# Patient Record
Sex: FEMALE | Race: WHITE | NOT HISPANIC OR LATINO | Employment: FULL TIME | URBAN - METROPOLITAN AREA
[De-identification: names, ages, dates, MRNs, and addresses within clinical notes are randomized per-mention and may not be internally consistent; named-entity substitution may affect disease eponyms.]

---

## 2017-07-28 ENCOUNTER — HOSPITAL ENCOUNTER (EMERGENCY)
Facility: HOSPITAL | Age: 22
Discharge: HOME | End: 2017-07-28
Payer: MEDICAID

## 2017-07-28 VITALS
RESPIRATION RATE: 20 BRPM | HEIGHT: 66 IN | HEART RATE: 78 BPM | SYSTOLIC BLOOD PRESSURE: 119 MMHG | WEIGHT: 181 LBS | BODY MASS INDEX: 29.09 KG/M2 | OXYGEN SATURATION: 98 % | TEMPERATURE: 98 F | DIASTOLIC BLOOD PRESSURE: 71 MMHG

## 2017-07-28 DIAGNOSIS — R10.2 PELVIC PAIN IN FEMALE: ICD-10-CM

## 2017-07-28 DIAGNOSIS — N39.0 URINARY TRACT INFECTION WITHOUT HEMATURIA, SITE UNSPECIFIED: Primary | ICD-10-CM

## 2017-07-28 LAB
B-HCG UR QL: NEGATIVE
BACTERIA #/AREA URNS HPF: ABNORMAL /HPF
BILIRUB UR QL STRIP: NEGATIVE
CLARITY UR: CLEAR
COLOR UR: YELLOW
GLUCOSE UR QL STRIP: NEGATIVE
HGB UR QL STRIP: NEGATIVE
KETONES UR QL STRIP: NEGATIVE
LEUKOCYTE ESTERASE UR QL STRIP: ABNORMAL
MICROSCOPIC COMMENT: ABNORMAL
NITRITE UR QL STRIP: POSITIVE
PH UR STRIP: 6 [PH] (ref 5–8)
PROT UR QL STRIP: NEGATIVE
SP GR UR STRIP: 1.02 (ref 1–1.03)
SQUAMOUS #/AREA URNS HPF: 5 /HPF
URN SPEC COLLECT METH UR: ABNORMAL
UROBILINOGEN UR STRIP-ACNC: NEGATIVE EU/DL
WBC #/AREA URNS HPF: 30 /HPF (ref 0–5)
WBC CLUMPS URNS QL MICRO: ABNORMAL

## 2017-07-28 PROCEDURE — 81000 URINALYSIS NONAUTO W/SCOPE: CPT

## 2017-07-28 PROCEDURE — 99283 EMERGENCY DEPT VISIT LOW MDM: CPT

## 2017-07-28 PROCEDURE — 81025 URINE PREGNANCY TEST: CPT

## 2017-07-28 RX ORDER — NITROFURANTOIN (MACROCRYSTALS) 100 MG/1
100 CAPSULE ORAL 2 TIMES DAILY
Qty: 10 CAPSULE | Refills: 0 | Status: SHIPPED | OUTPATIENT
Start: 2017-07-28 | End: 2017-08-02

## 2017-07-29 NOTE — ED PROVIDER NOTES
"SCRIBE #1 NOTE: I, Kristen Issac, am scribing for, and in the presence of, KARINA Gloria. I have scribed the entire note.      History      Chief Complaint   Patient presents with    Pelvic Pain     pt c/o lower abd pain; states she is currently on her cycle       Review of patient's allergies indicates:  No Known Allergies     HPI   HPI    7/28/2017, 8:58 PM   History obtained from the patient      History of Present Illness: Melany Pfeiffer is a 22 y.o. female patient who presents to the Emergency Department for pelvic pain with lower abd pain which onset gradually over the last few days. Symptoms are constant and moderate in severity. Pt reports that she is on her menstrual cycle currently and "feels like everything is being pulled in her pelvic area when she stands." No mitigating or exacerbating factors reported. No associated sxs reported. Patient denies any fever, chills, constipation, hematochezia, dysuria, hematuria, urinary frequency, difficulty urinating, urine decreased, N/V/D and all other sxs at this time. No further complaints or concerns at this time.         Arrival mode: Personal vehicle     PCP: Masood Marcano MD       Past Medical History:  History reviewed. No pertinent past medical history.    Past Surgical History:  Past Surgical History:   Procedure Laterality Date    TUBAL LIGATION           Family History:  History reviewed. No pertinent family history.    Social History:  Social History     Social History Main Topics    Smoking status: Never Smoker    Smokeless tobacco: Not given    Alcohol use No    Drug use: No    Sexual activity: Not given       ROS   Review of Systems   Constitutional: Negative for chills and fever.   HENT: Negative for sore throat.    Respiratory: Negative for shortness of breath.    Cardiovascular: Negative for chest pain.   Gastrointestinal: Positive for abdominal pain (lower). Negative for blood in stool, constipation, diarrhea, nausea and " "vomiting.   Genitourinary: Positive for pelvic pain. Negative for dysuria, frequency and hematuria.   Musculoskeletal: Negative for back pain.   Skin: Negative for rash.   Neurological: Negative for weakness.   Hematological: Does not bruise/bleed easily.   All other systems reviewed and are negative.    Physical Exam      Initial Vitals [07/28/17 2023]   BP Pulse Resp Temp SpO2   119/71 78 20 98.1 °F (36.7 °C) 98 %      MAP       87          Physical Exam  Nursing Notes and Vital Signs Reviewed.  Constitutional: Patient is in no apparent distress. Well-developed and well-nourished.  Head: Atraumatic. Normocephalic.  Eyes: PERRL. EOM intact. Conjunctivae are not pale. No scleral icterus.  ENT: Mucous membranes are moist. Oropharynx is clear and symmetric.    Neck: Supple. Full ROM. No lymphadenopathy.  Cardiovascular: Regular rate. Regular rhythm. No murmurs, rubs, or gallops. Distal pulses are 2+ and symmetric.  Pulmonary/Chest: No respiratory distress. Clear to auscultation bilaterally. No wheezing, rales, or rhonchi.  Abdominal: Soft and non-distended.  There is no tenderness.  No rebound, guarding, or rigidity. Good bowel sounds.  Genitourinary: No CVA tenderness  Musculoskeletal: Moves all extremities. No obvious deformities. No edema. No calf tenderness.  Skin: Warm and dry.  Neurological:  Alert, awake, and appropriate.  Normal speech.  No acute focal neurological deficits are appreciated.  Psychiatric: Normal affect. Good eye contact. Appropriate in content.    ED Course    Procedures  ED Vital Signs:  Vitals:    07/28/17 2023   BP: 119/71   Pulse: 78   Resp: 20   Temp: 98.1 °F (36.7 °C)   TempSrc: Oral   SpO2: 98%   Weight: 82.1 kg (181 lb)   Height: 5' 5.5" (1.664 m)       Abnormal Lab Results:  Labs Reviewed   URINALYSIS - Abnormal; Notable for the following:        Result Value    Nitrite, UA Positive (*)     Leukocytes, UA Trace (*)     All other components within normal limits   URINALYSIS MICROSCOPIC " - Abnormal; Notable for the following:     WBC, UA 30 (*)     Bacteria, UA Moderate (*)     All other components within normal limits   PREGNANCY TEST, URINE RAPID        All Lab Results:  Results for orders placed or performed during the hospital encounter of 07/28/17   Urinalysis   Result Value Ref Range    Specimen UA Urine, Clean Catch     Color, UA Yellow Yellow, Straw, Hiral    Appearance, UA Clear Clear    pH, UA 6.0 5.0 - 8.0    Specific Gravity, UA 1.025 1.005 - 1.030    Protein, UA Negative Negative    Glucose, UA Negative Negative    Ketones, UA Negative Negative    Bilirubin (UA) Negative Negative    Occult Blood UA Negative Negative    Nitrite, UA Positive (A) Negative    Urobilinogen, UA Negative <2.0 EU/dL    Leukocytes, UA Trace (A) Negative   Pregnancy, urine rapid (UPT)   Result Value Ref Range    Preg Test, Ur Negative    Urinalysis Microscopic   Result Value Ref Range    WBC, UA 30 (H) 0 - 5 /hpf    WBC Clumps, UA Rare None-Rare    Bacteria, UA Moderate (A) None-Occ /hpf    Squam Epithel, UA 5 /hpf    Microscopic Comment SEE COMMENT             The Emergency Provider reviewed the vital signs and test results, which are outlined above.    ED Discussion   9:05 PM: Pt differed pelvic exam.  States she has an appointment with OB/GYN in 3 days.     9:24 PM: Discussed pt dx and plan of tx. Gave pt all f/u and return to the ED instructions. All questions and concerns were addressed at this time. Pt expresses understanding of information and instructions, and is comfortable with plan to discharge. Pt is stable for discharge.    I discussed with patient and/or family/caretaker that evaluation in the ED does not suggest any emergent or life threatening medical conditions requiring immediate intervention beyond what was provided in the ED, and I believe patient is safe for discharge.  Regardless, an unremarkable evaluation in the ED does not preclude the development or presence of a serious of life  threatening condition. As such, patient was instructed to return immediately for any worsening or change in current symptoms.        ED Medication(s):  Medications - No data to display    Discharge Medication List as of 7/28/2017  9:22 PM      START taking these medications    Details   nitrofurantoin (MACRODANTIN) 100 MG capsule Take 1 capsule (100 mg total) by mouth 2 (two) times daily., Starting Fri 7/28/2017, Until Wed 8/2/2017, Print             Follow-up Information     Masood Marcano MD In 3 days.    Specialty:  Internal Medicine  Contact information:  [address] [place] [id number] [phone]             OB/GYN.    Contact information:  Keep follow up appointment on 7/31/2017                   Medical Decision Making    Medical Decision Making:   Clinical Tests:   Lab Tests: Ordered and Reviewed           Scribe Attestation:   Scribe #1: I performed the above scribed service and the documentation accurately describes the services I performed. I attest to the accuracy of the note.    Attending:   Physician Attestation Statement for Scribe #1: I, Genoveva COLLINS, personally performed the services described in this documentation, as scribed by Kristen Davenport, in my presence, and it is both accurate and complete.          Clinical Impression       ICD-10-CM ICD-9-CM   1. Urinary tract infection without hematuria, site unspecified N39.0 599.0   2. Pelvic pain in female R10.2 625.9       Disposition:   Disposition: Discharged  Condition: Stable         Genoveva Trejo PA-C  07/29/17 1228

## 2018-08-15 ENCOUNTER — HOSPITAL ENCOUNTER (EMERGENCY)
Facility: HOSPITAL | Age: 23
Discharge: HOME | End: 2018-08-15
Attending: EMERGENCY MEDICINE
Payer: MEDICAID

## 2018-08-15 VITALS
RESPIRATION RATE: 17 BRPM | HEART RATE: 86 BPM | WEIGHT: 155 LBS | TEMPERATURE: 99 F | OXYGEN SATURATION: 100 % | HEIGHT: 66 IN | BODY MASS INDEX: 24.91 KG/M2 | DIASTOLIC BLOOD PRESSURE: 65 MMHG | SYSTOLIC BLOOD PRESSURE: 117 MMHG

## 2018-08-15 DIAGNOSIS — N39.0 URINARY TRACT INFECTION WITHOUT HEMATURIA, SITE UNSPECIFIED: Primary | ICD-10-CM

## 2018-08-15 DIAGNOSIS — A59.9 TRICHOMONIASIS: ICD-10-CM

## 2018-08-15 LAB
ALBUMIN SERPL BCP-MCNC: 3.7 G/DL
ALP SERPL-CCNC: 73 U/L
ALT SERPL W/O P-5'-P-CCNC: 13 U/L
AMPHET+METHAMPHET UR QL: NEGATIVE
ANION GAP SERPL CALC-SCNC: 9 MMOL/L
AST SERPL-CCNC: 19 U/L
B-HCG UR QL: NEGATIVE
BACTERIA #/AREA URNS HPF: ABNORMAL /HPF
BARBITURATES UR QL SCN>200 NG/ML: NEGATIVE
BASOPHILS # BLD AUTO: 0.02 K/UL
BASOPHILS NFR BLD: 0.3 %
BENZODIAZ UR QL SCN>200 NG/ML: NEGATIVE
BILIRUB SERPL-MCNC: 0.4 MG/DL
BILIRUB UR QL STRIP: NEGATIVE
BUN SERPL-MCNC: 7 MG/DL
BZE UR QL SCN: NEGATIVE
CALCIUM SERPL-MCNC: 8.8 MG/DL
CANNABINOIDS UR QL SCN: NEGATIVE
CHLORIDE SERPL-SCNC: 108 MMOL/L
CLARITY UR: CLEAR
CO2 SERPL-SCNC: 21 MMOL/L
COLOR UR: YELLOW
CREAT SERPL-MCNC: 0.6 MG/DL
CREAT UR-MCNC: 126.7 MG/DL
DIFFERENTIAL METHOD: ABNORMAL
EOSINOPHIL # BLD AUTO: 0.1 K/UL
EOSINOPHIL NFR BLD: 0.9 %
ERYTHROCYTE [DISTWIDTH] IN BLOOD BY AUTOMATED COUNT: 13.8 %
EST. GFR  (AFRICAN AMERICAN): >60 ML/MIN/1.73 M^2
EST. GFR  (NON AFRICAN AMERICAN): >60 ML/MIN/1.73 M^2
GLUCOSE SERPL-MCNC: 89 MG/DL
GLUCOSE UR QL STRIP: NEGATIVE
HCT VFR BLD AUTO: 34.3 %
HGB BLD-MCNC: 11.8 G/DL
HGB UR QL STRIP: NEGATIVE
KETONES UR QL STRIP: NEGATIVE
LEUKOCYTE ESTERASE UR QL STRIP: ABNORMAL
LIPASE SERPL-CCNC: 7 U/L
LYMPHOCYTES # BLD AUTO: 1.5 K/UL
LYMPHOCYTES NFR BLD: 18.3 %
MCH RBC QN AUTO: 30.5 PG
MCHC RBC AUTO-ENTMCNC: 34.4 G/DL
MCV RBC AUTO: 89 FL
METHADONE UR QL SCN>300 NG/ML: NEGATIVE
MICROSCOPIC COMMENT: ABNORMAL
MONOCYTES # BLD AUTO: 0.5 K/UL
MONOCYTES NFR BLD: 6.1 %
NEUTROPHILS # BLD AUTO: 6 K/UL
NEUTROPHILS NFR BLD: 74.4 %
NITRITE UR QL STRIP: NEGATIVE
OPIATES UR QL SCN: NEGATIVE
PCP UR QL SCN>25 NG/ML: NEGATIVE
PH UR STRIP: 7 [PH] (ref 5–8)
PLATELET # BLD AUTO: 223 K/UL
PMV BLD AUTO: 9.1 FL
POTASSIUM SERPL-SCNC: 4 MMOL/L
PROT SERPL-MCNC: 6.4 G/DL
PROT UR QL STRIP: NEGATIVE
RBC # BLD AUTO: 3.87 M/UL
SODIUM SERPL-SCNC: 138 MMOL/L
SP GR UR STRIP: 1.02 (ref 1–1.03)
TOXICOLOGY INFORMATION: NORMAL
TRICHOMONAS UR QL MICRO: ABNORMAL
URN SPEC COLLECT METH UR: ABNORMAL
UROBILINOGEN UR STRIP-ACNC: NEGATIVE EU/DL
WBC # BLD AUTO: 8 K/UL
WBC #/AREA URNS HPF: >100 /HPF (ref 0–5)

## 2018-08-15 PROCEDURE — 83690 ASSAY OF LIPASE: CPT

## 2018-08-15 PROCEDURE — 25500020 PHARM REV CODE 255: Performed by: EMERGENCY MEDICINE

## 2018-08-15 PROCEDURE — 87086 URINE CULTURE/COLONY COUNT: CPT

## 2018-08-15 PROCEDURE — 80053 COMPREHEN METABOLIC PANEL: CPT

## 2018-08-15 PROCEDURE — 85025 COMPLETE CBC W/AUTO DIFF WBC: CPT

## 2018-08-15 PROCEDURE — 80307 DRUG TEST PRSMV CHEM ANLYZR: CPT

## 2018-08-15 PROCEDURE — 96361 HYDRATE IV INFUSION ADD-ON: CPT

## 2018-08-15 PROCEDURE — 81025 URINE PREGNANCY TEST: CPT

## 2018-08-15 PROCEDURE — 96365 THER/PROPH/DIAG IV INF INIT: CPT

## 2018-08-15 PROCEDURE — 87491 CHLMYD TRACH DNA AMP PROBE: CPT

## 2018-08-15 PROCEDURE — 25000003 PHARM REV CODE 250: Performed by: EMERGENCY MEDICINE

## 2018-08-15 PROCEDURE — 63600175 PHARM REV CODE 636 W HCPCS: Performed by: EMERGENCY MEDICINE

## 2018-08-15 PROCEDURE — 81000 URINALYSIS NONAUTO W/SCOPE: CPT | Mod: 59

## 2018-08-15 PROCEDURE — 99285 EMERGENCY DEPT VISIT HI MDM: CPT | Mod: 25

## 2018-08-15 RX ORDER — CIPROFLOXACIN 500 MG/1
500 TABLET ORAL 2 TIMES DAILY
Qty: 20 TABLET | Refills: 0 | Status: SHIPPED | OUTPATIENT
Start: 2018-08-15 | End: 2018-08-25

## 2018-08-15 RX ORDER — METRONIDAZOLE 500 MG/1
2000 TABLET ORAL
Status: COMPLETED | OUTPATIENT
Start: 2018-08-15 | End: 2018-08-15

## 2018-08-15 RX ADMIN — METRONIDAZOLE 2000 MG: 500 TABLET ORAL at 10:08

## 2018-08-15 RX ADMIN — CEFTRIAXONE 1 G: 1 INJECTION, SOLUTION INTRAVENOUS at 10:08

## 2018-08-15 RX ADMIN — SODIUM CHLORIDE 1000 ML: 0.9 INJECTION, SOLUTION INTRAVENOUS at 09:08

## 2018-08-15 RX ADMIN — IOHEXOL 75 ML: 350 INJECTION, SOLUTION INTRAVENOUS at 10:08

## 2018-08-16 NOTE — ED PROVIDER NOTES
SCRIBE #1 NOTE: I, Pili Lakisha, am scribing for, and in the presence of, Afshin Evans MD. I have scribed the entire note.      History      Chief Complaint   Patient presents with    Abdominal Pain     RLQ x 3 days,        Review of patient's allergies indicates:  No Known Allergies     HPI   HPI    8/15/2018, 8:42 PM   History obtained from the patient      History of Present Illness: Melnay Pfeiffer is a 23 y.o. female patient who presents to the Emergency Department for RLQ abd pain which onset gradually 4 days ago and progressively worsened today. Symptoms are constant and moderate in severity. Sxs exacerbated with palpation. No other mitigating or exacerbating factors reported. No associated sxs reported. Patient denies any fever, chills, constipation, hematochezia, dysuria, hematuria, urinary frequency, n/v/d, and all other sxs at this time. No prior tx reported. No further complaints or concerns at this time.         Arrival mode: [facility]    PCP: Masood Marcano MD       Past Medical History:  No past medical history on file.    Past Surgical History:  Past Surgical History:   Procedure Laterality Date    TUBAL LIGATION           Family History:  Reviewed, not pertinent.    Social History:  Social History     Tobacco Use    Smoking status: Never Smoker    Smokeless tobacco: Never Used   Substance and Sexual Activity    Alcohol use: No    Drug use: No    Sexual activity: Yes     Partners: Male     Birth control/protection: None       ROS   Review of Systems   Constitutional: Negative for chills and fever.   HENT: Negative for sore throat.    Respiratory: Negative for shortness of breath.    Cardiovascular: Negative for chest pain.   Gastrointestinal: Positive for abdominal pain (RLQ). Negative for blood in stool, constipation, diarrhea, nausea and vomiting.   Genitourinary: Negative for dysuria, frequency and hematuria.   Musculoskeletal: Negative for back pain.   Skin: Negative for  "rash.   Neurological: Negative for weakness.   Hematological: Does not bruise/bleed easily.   All other systems reviewed and are negative.    Physical Exam      Initial Vitals [08/15/18 2035]   BP Pulse Resp Temp SpO2   (!) 118/55 110 18 98.6 °F (37 °C) 99 %      MAP       --          Physical Exam  Nursing Notes and Vital Signs Reviewed.  Constitutional: Patient is in no acute distress. Well-developed and well-nourished.  Head: Atraumatic. Normocephalic.  Eyes: PERRL. EOM intact. Conjunctivae are not pale. No scleral icterus.  ENT: Mucous membranes are moist. Oropharynx is clear and symmetric.    Neck: Supple. Full ROM. No lymphadenopathy.  Cardiovascular: Regular rate. Regular rhythm. No murmurs, rubs, or gallops. Distal pulses are 2+ and symmetric.  Pulmonary/Chest: No respiratory distress. Clear to auscultation bilaterally. No wheezing or rales.  Abdominal: Soft and non-distended.  There is RLQ tenderness.  No rebound, guarding, or rigidity.   Musculoskeletal: Moves all extremities. No obvious deformities. No edema.   Skin: Warm and dry.  Neurological:  Alert, awake, and appropriate.  Normal speech.  No acute focal neurological deficits are appreciated.  Psychiatric: Normal affect. Good eye contact. Appropriate in content.    ED Course    Procedures  ED Vital Signs:  Vitals:    08/15/18 2035 08/15/18 2100 08/15/18 2215   BP: (!) 118/55 (!) 110/57 (!) 102/59   Pulse: 110 85 81   Resp: 18  17   Temp: 98.6 °F (37 °C) 98.6 °F (37 °C)    TempSrc: Oral     SpO2: 99% 96% 100%   Weight: 70.3 kg (155 lb)     Height: 5' 6" (1.676 m)         Abnormal Lab Results:  Labs Reviewed   CBC W/ AUTO DIFFERENTIAL - Abnormal; Notable for the following components:       Result Value    RBC 3.87 (*)     Hemoglobin 11.8 (*)     Hematocrit 34.3 (*)     MPV 9.1 (*)     Gran% 74.4 (*)     All other components within normal limits   COMPREHENSIVE METABOLIC PANEL - Abnormal; Notable for the following components:    CO2 21 (*)     All other " components within normal limits   URINALYSIS, REFLEX TO URINE CULTURE - Abnormal; Notable for the following components:    Leukocytes, UA 3+ (*)     All other components within normal limits    Narrative:     Preferred Collection Type->Urine, Clean Catch   URINALYSIS MICROSCOPIC - Abnormal; Notable for the following components:    WBC, UA >100 (*)     Bacteria, UA Few (*)     Trichomonas, UA Few (*)     All other components within normal limits    Narrative:     Preferred Collection Type->Urine, Clean Catch   C. TRACHOMATIS/N. GONORRHOEAE BY AMP DNA   CULTURE, URINE   C. TRACHOMATIS/N. GONORRHOEAE BY AMP DNA   LIPASE   PREGNANCY TEST, URINE RAPID   DRUG SCREEN PANEL, URINE EMERGENCY        All Lab Results:  Results for orders placed or performed during the hospital encounter of 08/15/18   CBC W/ AUTO DIFFERENTIAL   Result Value Ref Range    WBC 8.00 3.90 - 12.70 K/uL    RBC 3.87 (L) 4.00 - 5.40 M/uL    Hemoglobin 11.8 (L) 12.0 - 16.0 g/dL    Hematocrit 34.3 (L) 37.0 - 48.5 %    MCV 89 82 - 98 fL    MCH 30.5 27.0 - 31.0 pg    MCHC 34.4 32.0 - 36.0 g/dL    RDW 13.8 11.5 - 14.5 %    Platelets 223 150 - 350 K/uL    MPV 9.1 (L) 9.2 - 12.9 fL    Gran # (ANC) 6.0 1.8 - 7.7 K/uL    Lymph # 1.5 1.0 - 4.8 K/uL    Mono # 0.5 0.3 - 1.0 K/uL    Eos # 0.1 0.0 - 0.5 K/uL    Baso # 0.02 0.00 - 0.20 K/uL    Gran% 74.4 (H) 38.0 - 73.0 %    Lymph% 18.3 18.0 - 48.0 %    Mono% 6.1 4.0 - 15.0 %    Eosinophil% 0.9 0.0 - 8.0 %    Basophil% 0.3 0.0 - 1.9 %    Differential Method Automated    Comp. Metabolic Panel   Result Value Ref Range    Sodium 138 136 - 145 mmol/L    Potassium 4.0 3.5 - 5.1 mmol/L    Chloride 108 95 - 110 mmol/L    CO2 21 (L) 23 - 29 mmol/L    Glucose 89 70 - 110 mg/dL    BUN, Bld 7 6 - 20 mg/dL    Creatinine 0.6 0.5 - 1.4 mg/dL    Calcium 8.8 8.7 - 10.5 mg/dL    Total Protein 6.4 6.0 - 8.4 g/dL    Albumin 3.7 3.5 - 5.2 g/dL    Total Bilirubin 0.4 0.1 - 1.0 mg/dL    Alkaline Phosphatase 73 55 - 135 U/L    AST 19 10 - 40  U/L    ALT 13 10 - 44 U/L    Anion Gap 9 8 - 16 mmol/L    eGFR if African American >60 >60 mL/min/1.73 m^2    eGFR if non African American >60 >60 mL/min/1.73 m^2   Lipase   Result Value Ref Range    Lipase 7 4 - 60 U/L   Urinalysis, Reflex to Urine Culture Urine, Clean Catch   Result Value Ref Range    Specimen UA Urine, Clean Catch     Color, UA Yellow Yellow, Straw, Hiral    Appearance, UA Clear Clear    pH, UA 7.0 5.0 - 8.0    Specific Gravity, UA 1.020 1.005 - 1.030    Protein, UA Negative Negative    Glucose, UA Negative Negative    Ketones, UA Negative Negative    Bilirubin (UA) Negative Negative    Occult Blood UA Negative Negative    Nitrite, UA Negative Negative    Urobilinogen, UA Negative <2.0 EU/dL    Leukocytes, UA 3+ (A) Negative   Pregnancy, urine rapid (UPT)   Result Value Ref Range    Preg Test, Ur Negative    Drug screen panel, emergency   Result Value Ref Range    Benzodiazepines Negative     Methadone metabolites Negative     Cocaine (Metab.) Negative     Opiate Scrn, Ur Negative     Barbiturate Screen, Ur Negative     Amphetamine Screen, Ur Negative     THC Negative     Phencyclidine Negative     Creatinine, Random Ur 126.7 15.0 - 325.0 mg/dL    Toxicology Information SEE COMMENT    Urinalysis Microscopic   Result Value Ref Range    WBC, UA >100 (H) 0 - 5 /hpf    Bacteria, UA Few (A) None-Occ /hpf    Trichomonas, UA Few (A) None    Microscopic Comment SEE COMMENT          Imaging Results:  Imaging Results          CT Abdomen Pelvis With Contrast (Final result)  Result time 08/15/18 22:17:01    Final result by Praveen Bruce MD (08/15/18 22:17:01)                 Impression:      No evidence of appendicitis.  See above for additional findings.    All CT scans at this facility use dose modulation, iterative reconstruction, and/or weight based dosing when appropriate to reduce radiation dose to as low as reasonable achievable.      Electronically signed by: Praveen Bruce  MD  Date:    08/15/2018  Time:    22:17             Narrative:    EXAMINATION:  CT ABDOMEN PELVIS WITH CONTRAST    CLINICAL HISTORY:  RLQ pain, appendicitis suspected;    TECHNIQUE:  Low dose axial images, sagittal and coronal reformations were obtained from the lung bases to the pubic symphysis following the IV administration of 75 mL of Omnipaque 350.    COMPARISON:  None    FINDINGS:  Heart: Normal size. No effusion.    Lung Bases: Clear.    Liver: Normal size and attenuation. No focal lesions.    Gallbladder: No calcified gallstones.    Bile Ducts: No dilatation.    Pancreas: No mass. No peripancreatic fat stranding.    Spleen: Normal.    Adrenals: Normal.    Kidneys/Ureters: Normal enhancement.  No mass or  hydroureteronephrosis.    Bladder: No wall thickening.    Reproductive organs: Fluid is seen within the endometrial canal.  Mild prominence of the right adnexa.  Left ovary appears normal.  Ultrasound can be obtained as clinically warranted.    GI Tract/Mesentery: No evidence of bowel obstruction or inflammation.    Peritoneal Space: Trace fluid is seen within the pelvis which may be physiologic.  Shotty nodes are seen within the mesentery which may be reactive.  No evidence of free air.    Retroperitoneum: No significant adenopathy.    Abdominal wall: Normal.    Vasculature: No aneurysm.    Bones: No acute fracture. No suspicious lytic or sclerotic lesions.                                        The Emergency Provider reviewed the vital signs and test results, which are outlined above.    ED Discussion     10:32 PM: Re-evaluated pt. Pt is resting comfortably and is in no acute distress.  Pt states her abdominal pain has improved. Discussed with pt all pertinent ED information and results. Discussed pt dx and plan of tx. Gave pt all f/u and return to the ED instructions. All questions and concerns were addressed at this time. Pt expresses understanding of information and instructions, and is comfortable with  plan to discharge. Pt is stable for discharge.    I discussed with patient and/or family/caretaker that evaluation in the ED does not suggest any emergent or life threatening medical conditions requiring immediate intervention beyond what was provided in the ED, and I believe patient is safe for discharge.  Regardless, an unremarkable evaluation in the ED does not preclude the development or presence of a serious of life threatening condition. As such, patient was instructed to return immediately for any worsening or change in current symptoms.    ED Medication(s):  Medications   cefTRIAXone (ROCEPHIN) 1 g in dextrose 5 % 50 mL IVPB (1 g Intravenous New Bag 8/15/18 2242)   sodium chloride 0.9% bolus 1,000 mL (0 mLs Intravenous Stopped 8/15/18 2233)   omnipaque 350 iohexol 75 mL (75 mLs Intravenous Given 8/15/18 2204)   metroNIDAZOLE tablet 2,000 mg (2,000 mg Oral Given 8/15/18 2242)       New Prescriptions    CIPROFLOXACIN HCL (CIPRO) 500 MG TABLET    Take 1 tablet (500 mg total) by mouth 2 (two) times daily. for 10 days       Follow-up Information     Masood Marcano MD. Call in 1 day.    Specialty:  Internal Medicine  Contact information:  [address] [address] [phone]             [facility]    Specialty:  Emergency Medicine  Why:  If symptoms worsen  Contact information:  [address] [facility] [address] [phone]           GYN. Call in 2 days.                   Medical Decision Making    Medical Decision Making:   Clinical Tests:   Lab Tests: Ordered and Reviewed  Radiological Study: Ordered and Reviewed           Scribe Attestation:   Scribe #1: I performed the above scribed service and the documentation accurately describes the services I performed. I attest to the accuracy of the note.    Attending:   Physician Attestation Statement for Scribe #1: I, Afshin Evans MD, personally performed the services described in  this documentation, as scribed by Pili Banuelos, in my presence, and it is both accurate and complete.          Clinical Impression       ICD-10-CM ICD-9-CM   1. Urinary tract infection without hematuria, site unspecified N39.0 599.0   2. Trichomoniasis A59.9 131.9       Disposition:   Disposition: Discharged  Condition: Stable         Afshin Evans MD  08/16/18 0043

## 2018-08-17 LAB — BACTERIA UR CULT: NORMAL

## 2018-08-18 LAB
C TRACH DNA SPEC QL NAA+PROBE: NOT DETECTED
N GONORRHOEA DNA SPEC QL NAA+PROBE: NOT DETECTED

## 2018-10-15 ENCOUNTER — HOSPITAL ENCOUNTER (EMERGENCY)
Facility: HOSPITAL | Age: 23
Discharge: HOME | End: 2018-10-15
Attending: EMERGENCY MEDICINE
Payer: MEDICAID

## 2018-10-15 VITALS
SYSTOLIC BLOOD PRESSURE: 137 MMHG | RESPIRATION RATE: 20 BRPM | WEIGHT: 172.63 LBS | TEMPERATURE: 98 F | BODY MASS INDEX: 28.76 KG/M2 | HEART RATE: 90 BPM | HEIGHT: 65 IN | OXYGEN SATURATION: 98 % | DIASTOLIC BLOOD PRESSURE: 71 MMHG
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
## 2018-10-15 DIAGNOSIS — S83.92XA SPRAIN OF LEFT KNEE, UNSPECIFIED LIGAMENT, INITIAL ENCOUNTER: Primary | ICD-10-CM

## 2018-10-15 PROCEDURE — 99284 EMERGENCY DEPT VISIT MOD MDM: CPT | Mod: 25

## 2018-10-15 PROCEDURE — 25000003 PHARM REV CODE 250: Performed by: REGISTERED NURSE

## 2018-10-15 PROCEDURE — 29505 APPLICATION LONG LEG SPLINT: CPT | Mod: LT

## 2018-10-15 RX ORDER — TRAMADOL HYDROCHLORIDE 50 MG/1
50 TABLET ORAL EVERY 8 HOURS PRN
Qty: 12 TABLET | Refills: 0 | Status: SHIPPED | OUTPATIENT
Start: 2018-10-15 | End: 2018-10-25

## 2018-10-15 RX ORDER — HYDROCODONE BITARTRATE AND ACETAMINOPHEN 10; 325 MG/1; MG/1
1 TABLET ORAL
Status: COMPLETED | OUTPATIENT
Start: 2018-10-15 | End: 2018-10-15

## 2018-10-15 RX ORDER — DICLOFENAC SODIUM 50 MG/1
50 TABLET, DELAYED RELEASE ORAL 2 TIMES DAILY
Qty: 20 TABLET | Refills: 0 | Status: SHIPPED | OUTPATIENT
Start: 2018-10-15 | End: 2022-04-05

## 2018-10-15 RX ADMIN — HYDROCODONE BITARTRATE AND ACETAMINOPHEN 1 TABLET: 10; 325 TABLET ORAL at 09:10

## 2018-10-16 NOTE — ED PROVIDER NOTES
SCRIBE #1 NOTE: I, Pili Banuelos, am scribing for, and in the presence of, Chavo Ayala Jr., [facility]. I have scribed the entire note.      History      Chief Complaint   Patient presents with    Knee Pain     L knee pain, swelling, bruising after fall yesterday       Review of patient's allergies indicates:  No Known Allergies     HPI   HPI    10/15/2018, 8:51 PM   History obtained from the patient      History of Present Illness: Melany Pfeiffer is a 23 y.o. female patient who presents to the Emergency Department for L knee pain/swelling which onset gradually after a fall that occurred last night. Symptoms are constant and moderate in severity. Sxs exacerbated with bending and walking. No mitigating factors reported. No associated sxs reported. Patient denies any head injury/trauma, LOC,  HA, dizziness, back pain, neck pain, extremity weakness/numbness, color change, wound, abd pain, CP, SOB, other pain/injury, and all other sxs at this time. No further complaints or concerns at this time.         Arrival mode: Personal vehicle    PCP: Masood Marcano MD       Past Medical History:  Past medical history reviewed not relevant      Past Surgical History:  Past Surgical History:   Procedure Laterality Date    TUBAL LIGATION           Family History:  Family history reviewed not relevant    Social History:  Social History     Tobacco Use    Smoking status: Never Smoker    Smokeless tobacco: Never Used   Substance and Sexual Activity    Alcohol use: No    Drug use: No    Sexual activity: Yes     Partners: Male     Birth control/protection: None       ROS   Review of Systems   Constitutional: Negative for fever.   HENT: Negative for sore throat.    Respiratory: Negative for shortness of breath.    Cardiovascular: Negative for chest pain.   Gastrointestinal: Negative for abdominal pain and nausea.   Genitourinary: Negative for dysuria.   Musculoskeletal: Negative for back pain and neck pain.        + L  knee pain -other pain/injury   Skin: Negative for color change, rash and wound.   Neurological: Negative for dizziness, weakness, numbness and headaches.        -head injury/trauma -LOC   Hematological: Does not bruise/bleed easily.   All other systems reviewed and are negative.    Physical Exam      Initial Vitals [10/15/18 1942]   BP Pulse Resp Temp SpO2   (!) 141/73 92 18 98.2 °F (36.8 °C) 96 %      MAP       --          Physical Exam  Nursing Notes and Vital Signs Reviewed.  Constitutional: Patient is in no acute distress. Well-developed and well-nourished.  Head: Atraumatic. Normocephalic.  Eyes: PERRL. EOM intact. Conjunctivae are not pale. No scleral icterus.  ENT: Mucous membranes are moist. Oropharynx is clear and symmetric.    Neck: Supple. Full ROM. No lymphadenopathy.  Cardiovascular: Regular rate. Regular rhythm. No murmurs, rubs, or gallops. Distal pulses are 2+ and symmetric.  Pulmonary/Chest: No respiratory distress. Clear to auscultation bilaterally. No wheezing or rales.  Abdominal: Soft and non-distended.  There is no tenderness.  No rebound, guarding, or rigidity.   Musculoskeletal: Moves all extremities. No obvious deformities. No edema.   LLE/Left Knee:  Ecchymosis noted to the proximal LLE and to anterior L knee. There is mild swelling. There is tenderness. No obvious deformity. No increased warmth, erythema, induration or fluctuance.  No ligament laxity. DP and PT pulses are 2+.  Normal capillary refill.  Distal sensation is intact.  Skin: Warm and dry.   Neurological:  Alert, awake, and appropriate.  Normal speech.  No acute focal neurological deficits are appreciated.  Psychiatric: Normal affect. Good eye contact. Appropriate in content.    ED Course    Orthopedic Injury  Date/Time: 10/15/2018 9:01 PM  Performed by: MARIE Villegas Jr.  Authorized by: Yaakov Kruse MD     Consent Done?:  Yes  Universal Protocol:     Verbal consent obtained?: Yes      Consent given by:  Patient     "Patient states understanding of procedure being performed: Yes    Injury:     Injury location:  Knee    Location details:  Left knee    Injury type:  Soft tissue      Pre-procedure assessment:     Neurovascular status: Neurovascularly intact      Distal perfusion: normal      Neurological function: normal      Range of motion: reduced      Local anesthesia used?: No      Patient sedated?: No        Selections made in this section will also lock the Injury type section above.:     Immobilization: knee immobilizer.    Complications: No      Specimens: No      Implants: No    Post-procedure assessment:     Neurovascular status: Neurovascularly intact      Distal perfusion: normal      Neurological function: normal      Range of motion: splinted      Patient tolerance:  Patient tolerated the procedure well with no immediate complications      ED Vital Signs:  Vitals:    10/15/18 1942 10/15/18 2207   BP: (!) 141/73 137/71   Pulse: 92 90   Resp: 18 20   Temp: 98.2 °F (36.8 °C) 98 °F (36.7 °C)   TempSrc: Oral    SpO2: 96% 98%   Weight: 78.3 kg (172 lb 9.9 oz)    Height: 5' 5" (1.651 m)        Abnormal Lab Results:  Labs Reviewed - No data to display     All Lab Results:      Imaging Results:  Imaging Results          X-Ray Knee Complete 4 or More Views Left (Final result)  Result time 10/15/18 20:34:21    Final result by Fracisco Ramos Jr., MD (10/15/18 20:34:21)                 Impression:      No acute findings.      Electronically signed by: Fracisco Ramos MD  Date:    10/15/2018  Time:    20:34             Narrative:    EXAMINATION:  XR KNEE COMP 4 OR MORE VIEWS LEFT    CLINICAL HISTORY:  Unspecified fall, initial encounter    COMPARISON:  No comparison studies are available.    FINDINGS:  Bone alignment is satisfactory.  No fracture or dislocation.  No advanced arthritic change.  No significant soft tissue findings.                                        The Emergency Provider reviewed the vital signs and test " results, which are outlined above.    ED Discussion     9:32 PM: Reassessed pt at this time.  Pt is awake, alert, and in NAD at this time. Discussed with pt all pertinent ED information and results. Discussed pt dx and plan of tx. Gave pt all f/u and return to the ED instructions. All questions and concerns were addressed at this time. Pt expresses understanding of information and instructions, and is comfortable with plan to discharge. Pt is stable for discharge.      I discussed with patient and/or family/caretaker that evaluation in the ED does not suggest any emergent or life threatening medical conditions requiring immediate intervention beyond what was provided in the ED, and I believe patient is safe for discharge.  Regardless, an unremarkable evaluation in the ED does not preclude the development or presence of a serious of life threatening condition. As such, patient was instructed to return immediately for any worsening or change in current symptoms.    Discharge Medication List as of 10/15/2018  9:31 PM      START taking these medications    Details   diclofenac (VOLTAREN) 50 MG EC tablet Take 1 tablet (50 mg total) by mouth 2 (two) times daily., Starting Mon 10/15/2018, Print      traMADol (ULTRAM) 50 mg tablet Take 1 tablet (50 mg total) by mouth every 8 (eight) hours as needed for Pain., Starting Mon 10/15/2018, Until Thu 10/25/2018, Print               ED Medication(s):  Medications   HYDROcodone-acetaminophen  mg per tablet 1 tablet (1 tablet Oral Given 10/15/18 6945)       Follow-up Information     Masood Marcano MD In 1 week.    Specialty:  Internal Medicine  Contact information:  [address] [place] [address] [id number] [phone]                     Medical Decision Making    Medical Decision Making:   Clinical Tests:   Radiological Study: Ordered and Reviewed           Scribe Attestation:   Scribe #1: I performed the above scribed service and the documentation accurately  describes the services I performed. I attest to the accuracy of the note.    Attending:   Physician Attestation Statement for Scribe #1: I, MARIE Villegas Jr., personally performed the services described in this documentation, as scribed by Pili Banuelos, in my presence, and it is both accurate and complete.          Clinical Impression       ICD-10-CM ICD-9-CM   1. Sprain of left knee, unspecified ligament, initial encounter S83.92XA 844.9   2. Fall W19.XXXA E888.9       Disposition:   Disposition: Discharged  Condition: Stable         MARIE Villegas Jr.  10/16/18 0463

## 2019-07-02 ENCOUNTER — HOSPITAL ENCOUNTER (EMERGENCY)
Facility: HOSPITAL | Age: 24
Discharge: HOME | End: 2019-07-02
Attending: EMERGENCY MEDICINE
Payer: MEDICAID

## 2019-07-02 VITALS
OXYGEN SATURATION: 98 % | RESPIRATION RATE: 18 BRPM | WEIGHT: 172.81 LBS | DIASTOLIC BLOOD PRESSURE: 57 MMHG | HEIGHT: 65 IN | SYSTOLIC BLOOD PRESSURE: 102 MMHG | HEART RATE: 66 BPM | TEMPERATURE: 99 F | BODY MASS INDEX: 28.79 KG/M2

## 2019-07-02 DIAGNOSIS — T63.481A ALLERGIC REACTION TO INSECT STING, ACCIDENTAL OR UNINTENTIONAL, INITIAL ENCOUNTER: Primary | ICD-10-CM

## 2019-07-02 PROCEDURE — 99283 EMERGENCY DEPT VISIT LOW MDM: CPT

## 2019-07-02 PROCEDURE — 63600175 PHARM REV CODE 636 W HCPCS: Performed by: EMERGENCY MEDICINE

## 2019-07-02 PROCEDURE — 25000003 PHARM REV CODE 250: Performed by: EMERGENCY MEDICINE

## 2019-07-02 RX ORDER — PREDNISONE 20 MG/1
60 TABLET ORAL
Status: COMPLETED | OUTPATIENT
Start: 2019-07-02 | End: 2019-07-02

## 2019-07-02 RX ORDER — PREDNISONE 20 MG/1
40 TABLET ORAL DAILY
Qty: 4 TABLET | Refills: 0 | Status: SHIPPED | OUTPATIENT
Start: 2019-07-02 | End: 2019-07-04

## 2019-07-02 RX ORDER — FAMOTIDINE 20 MG/1
40 TABLET, FILM COATED ORAL
Status: COMPLETED | OUTPATIENT
Start: 2019-07-02 | End: 2019-07-02

## 2019-07-02 RX ORDER — EPINEPHRINE 0.3 MG/.3ML
1 INJECTION SUBCUTANEOUS
Qty: 2 EACH | Refills: 0 | Status: SHIPPED | OUTPATIENT
Start: 2019-07-02 | End: 2022-04-05

## 2019-07-02 RX ADMIN — PREDNISONE 60 MG: 20 TABLET ORAL at 09:07

## 2019-07-02 RX ADMIN — FAMOTIDINE 40 MG: 20 TABLET, FILM COATED ORAL at 09:07

## 2019-07-03 NOTE — ED PROVIDER NOTES
"SCRIBE #1 NOTE: I, Ulises Bahena, am scribing for, and in the presence of, Praveen Diaz MD. I have scribed the entire note.       History     Chief Complaint   Patient presents with    Insect Bite     Bee sting; allergy to bee sting. Pt took 50 mg benadryl at home and [facility] administered 0.3 mg Epi at 1955.     Review of patient's allergies indicates:   Allergen Reactions    Bee sting [allergen ext-venom-honey bee]          History of Present Illness     HPI    7/2/2019, 8:52 PM  History obtained from the patient      History of Present Illness: Melany Pfeiffer is a 24 y.o. female patient who presents to the Emergency Department for evaluation of an allergic reaction to an unknown insect bite which onset this afternoon. Pt states that she was sitting in the grass and felt a sting on her backside.  Pt reports that her throat closed up, and she developed a rash on her arms, but is asymptomatic now and states that she "feels fine" and is "ready to go home" at this time. Symptoms are resolved at this time. Pt took 50mg Benadryl PTA and was given an epinephrine shot in EMS. No associated sx reported. Patient denies any SOB, weakness, CP, trouble swallowing, voice change, n/v/d, and all other sxs at this time. No further complaints or concerns at this time.         Arrival mode: EMS    PCP: Masood Marcano MD       Past Medical History:  History reviewed. No pertinent medical history.    Family History:  History reviewed. No pertinent family history.      Past Surgical History:  Past Surgical History:   Procedure Laterality Date    TUBAL LIGATION           Social History:  Social History     Tobacco Use    Smoking status: Never Smoker    Smokeless tobacco: Never Used   Substance and Sexual Activity    Alcohol use: No    Drug use: No    Sexual activity: Yes     Partners: Male     Birth control/protection: None        Review of Systems     Review of Systems   Constitutional: Negative for chills and fever. "   HENT: Negative for sore throat, trouble swallowing and voice change.    Respiratory: Negative for shortness of breath.    Cardiovascular: Negative for chest pain.   Gastrointestinal: Negative for diarrhea, nausea and vomiting.   Genitourinary: Negative for dysuria.   Musculoskeletal: Negative for back pain.   Skin: Negative for rash.   Neurological: Negative for weakness.   Hematological: Does not bruise/bleed easily.   All other systems reviewed and are negative.       Physical Exam     Initial Vitals [07/02/19 2041]   BP Pulse Resp Temp SpO2   134/60 66 18 98.6 °F (37 °C) 98 %      MAP       --          Physical Exam   Nursing Notes and Vital Signs Reviewed.  Constitutional: Patient is in no acute distress. Well-developed and well-nourished.  Head: Atraumatic. Normocephalic.  Eyes: PERRL. EOM intact. Conjunctivae are not pale. No scleral icterus.  ENT: No oropharyngeal swelling, Mucous membranes are moist. Oropharynx is clear and symmetric.    Neck: Supple. Full ROM. No lymphadenopathy.  Cardiovascular: Regular rate. Regular rhythm. No murmurs, rubs, or gallops. Distal pulses are 2+ and symmetric.  Pulmonary/Chest: No respiratory distress. Clear to auscultation bilaterally. No wheezing, stridor or rales.  Abdominal: Soft and non-distended.  There is no tenderness.  No rebound, guarding, or rigidity. Good bowel sounds.  Genitourinary: No CVA tenderness  Musculoskeletal: Moves all extremities. No obvious deformities. No edema. No calf tenderness.  Skin: Warm and dry. No hives  Neurological:  Alert, awake, and appropriate.  Normal speech.  No acute focal neurological deficits are appreciated.  Psychiatric: Normal affect. Good eye contact. Appropriate in content.     ED Course   Procedures  ED Vital Signs:  Vitals:    07/02/19 2041 07/02/19 2110 07/02/19 2211   BP: 134/60  (!) 102/57   Pulse: 66     Resp: 18     Temp: 98.6 °F (37 °C)     TempSrc: Oral     SpO2: 98%     Weight:  78.4 kg (172 lb 12.8 oz)    Height: 5'  "5" (1.651 m)                The Emergency Provider reviewed the vital signs and test results, which are outlined above.     ED Discussion     10:12 PM: Reassessed pt at this time.  Reccommdned to pt that she remain in ED for 4 hour evaluation period s/p epinephrine shot, but pt is strongly requesting to leave at this time.  Pt understands and accepts all risks associated with leaving at this time.  Discussed with pt all pertinent ED information and results. Discussed pt dx and plan of tx. Gave pt all f/u and return to the ED instructions. All questions and concerns were addressed at this time. Pt expresses understanding of information and instructions, and is comfortable with plan to discharge. Pt is stable for discharge.    Patient is safe for discharge. There is no suggestion of airway or ENT emergency. Patient is hemodynamically stable and there is no suggestion of active anaphylaxis or progressive worsening of current symptoms.          ED Medication(s):  Medications   predniSONE tablet 60 mg (60 mg Oral Given 7/2/19 2111)   famotidine tablet 40 mg (40 mg Oral Given 7/2/19 2111)       Discharge Medication List as of 7/2/2019 10:16 PM      START taking these medications    Details   EPINEPHrine (EPIPEN) 0.3 mg/0.3 mL AtIn Inject 0.3 mLs (0.3 mg total) into the muscle as needed (allergic reaction)., Starting Tue 7/2/2019, Print      predniSONE (DELTASONE) 20 MG tablet Take 2 tablets (40 mg total) by mouth once daily. for 2 days, Starting Tue 7/2/2019, Until Thu 7/4/2019, Print             Follow-up Information     Call  Masood Marcano MD.    Specialty:  Internal Medicine  Contact information:  [address]  [facility] [id number] [phone]             [facility] - .    Specialty:  Emergency Medicine  Why:  As needed, If symptoms worsen  Contact information:  [address]  [facility] [address] [phone]                       Medical Decision Making: "   24-year-old female who believes she was stung by an insect; she reports a known bee allergy; patient took Benadryl and was given epinephrine by EMS prior to arrival; at the time of arrival to the emergency department, patient is asymptomatic with a normal physical exam and denies all symptoms; she stated that she was ready to go as soon as I initially evaluated her; I did give patient prednisone, Pepcid in addition to the Benadryl and epinephrine she had received; I discussed with patient family that we typically observed in the emergency department for 4 hr after epinephrine; patient was observed in the emergency department for just over an hour when she requested to leave; she had decision-making capacity understood the risks of leaving including and a return of her allergic reaction, anaphylaxis, and death; she has chosen to accept those risks; advised strict ER return precautions should any of her symptoms return; discharged home with EpiPen and prednisone prescription and advised to use Benadryl p.r.n.             Scribe Attestation:   Scribe #1: I performed the above scribed service and the documentation accurately describes the services I performed. I attest to the accuracy of the note.     Attending:   Physician Attestation Statement for Scribe #1: I, Praveen Diaz MD, personally performed the services described in this documentation, as scribed by Ulises Bahena, in my presence, and it is both accurate and complete.           Clinical Impression       ICD-10-CM ICD-9-CM   1. Allergic reaction to insect sting, accidental or unintentional, initial encounter T63.481A 989.5     E905.5       Disposition:   Disposition: Discharged  Condition: Stable           Praveen Diaz MD  07/02/19 4145